# Patient Record
Sex: FEMALE | Race: WHITE
[De-identification: names, ages, dates, MRNs, and addresses within clinical notes are randomized per-mention and may not be internally consistent; named-entity substitution may affect disease eponyms.]

---

## 2020-06-15 ENCOUNTER — HOSPITAL ENCOUNTER (OUTPATIENT)
Dept: HOSPITAL 75 - PREOP | Age: 70
Discharge: HOME | End: 2020-06-15
Attending: SURGERY
Payer: MEDICARE

## 2020-06-15 VITALS — WEIGHT: 196.65 LBS | BODY MASS INDEX: 31.6 KG/M2 | HEIGHT: 65.98 IN

## 2020-06-15 DIAGNOSIS — Z01.818: Primary | ICD-10-CM

## 2020-06-15 DIAGNOSIS — Z11.59: ICD-10-CM

## 2020-06-15 PROCEDURE — 87635 SARS-COV-2 COVID-19 AMP PRB: CPT

## 2020-06-18 ENCOUNTER — HOSPITAL ENCOUNTER (OUTPATIENT)
Dept: HOSPITAL 75 - SDC | Age: 70
Discharge: HOME | End: 2020-06-18
Attending: SURGERY
Payer: MEDICARE

## 2020-06-18 VITALS — DIASTOLIC BLOOD PRESSURE: 70 MMHG | SYSTOLIC BLOOD PRESSURE: 137 MMHG

## 2020-06-18 VITALS — SYSTOLIC BLOOD PRESSURE: 162 MMHG | DIASTOLIC BLOOD PRESSURE: 80 MMHG

## 2020-06-18 VITALS — BODY MASS INDEX: 31.98 KG/M2 | WEIGHT: 196.65 LBS | HEIGHT: 65.75 IN

## 2020-06-18 VITALS — DIASTOLIC BLOOD PRESSURE: 66 MMHG | SYSTOLIC BLOOD PRESSURE: 153 MMHG

## 2020-06-18 VITALS — SYSTOLIC BLOOD PRESSURE: 151 MMHG | DIASTOLIC BLOOD PRESSURE: 69 MMHG

## 2020-06-18 VITALS — DIASTOLIC BLOOD PRESSURE: 76 MMHG | SYSTOLIC BLOOD PRESSURE: 160 MMHG

## 2020-06-18 VITALS — DIASTOLIC BLOOD PRESSURE: 68 MMHG | SYSTOLIC BLOOD PRESSURE: 121 MMHG

## 2020-06-18 VITALS — SYSTOLIC BLOOD PRESSURE: 150 MMHG | DIASTOLIC BLOOD PRESSURE: 65 MMHG

## 2020-06-18 VITALS — SYSTOLIC BLOOD PRESSURE: 153 MMHG | DIASTOLIC BLOOD PRESSURE: 75 MMHG

## 2020-06-18 VITALS — DIASTOLIC BLOOD PRESSURE: 78 MMHG | SYSTOLIC BLOOD PRESSURE: 147 MMHG

## 2020-06-18 VITALS — DIASTOLIC BLOOD PRESSURE: 69 MMHG | SYSTOLIC BLOOD PRESSURE: 161 MMHG

## 2020-06-18 DIAGNOSIS — Z88.2: ICD-10-CM

## 2020-06-18 DIAGNOSIS — Z83.3: ICD-10-CM

## 2020-06-18 DIAGNOSIS — K80.10: Primary | ICD-10-CM

## 2020-06-18 DIAGNOSIS — Z90.710: ICD-10-CM

## 2020-06-18 DIAGNOSIS — K82.8: ICD-10-CM

## 2020-06-18 DIAGNOSIS — I10: ICD-10-CM

## 2020-06-18 DIAGNOSIS — Z11.2: ICD-10-CM

## 2020-06-18 DIAGNOSIS — Z79.899: ICD-10-CM

## 2020-06-18 DIAGNOSIS — Z80.42: ICD-10-CM

## 2020-06-18 PROCEDURE — 88304 TISSUE EXAM BY PATHOLOGIST: CPT

## 2020-06-18 PROCEDURE — 87081 CULTURE SCREEN ONLY: CPT

## 2020-06-18 RX ADMIN — SODIUM CHLORIDE, SODIUM LACTATE, POTASSIUM CHLORIDE, AND CALCIUM CHLORIDE PRN MLS/HR: 600; 310; 30; 20 INJECTION, SOLUTION INTRAVENOUS at 11:13

## 2020-06-18 RX ADMIN — SODIUM CHLORIDE, SODIUM LACTATE, POTASSIUM CHLORIDE, AND CALCIUM CHLORIDE PRN MLS/HR: 600; 310; 30; 20 INJECTION, SOLUTION INTRAVENOUS at 09:08

## 2020-06-18 NOTE — NUR
O2 SAT RUNNING 87% ON ROOM AIR. TURNED O2 ON AT 2 LITERS/MIN PER NASAL CANNULA. PATIENT 
REPORTS PAIN IS "GOING AWAY BUT I'M VERY SLEEPY." S.O. ATTENTIVE AT THE BEDSIDE.

## 2020-06-18 NOTE — XMS REPORT
Continuity of Care Document

                             Created on: 2020



JUSTUS ASH

External Reference #: [id number]

: 1950

Sex: Female



Demographics





                          Address                   [address]

[place]  [address]

 

                          Home Phone                [phone] x

 

                          Preferred Language        Unknown

 

                          Marital Status            Unknown

 

                          Pentecostalism Affiliation     Unknown

 

                          Race                      Unknown

 

                          Ethnic Group              Unknown





Author





                          Organization              Unknown

 

                          Address                   Unknown

 

                          Phone                     Unavailable



              



Allergies

      



             Active           Description           Code           Type         

  Severity   

                Reaction           Onset           Reported/Identified          

 

Relationship to Patient                 Clinical Status        

 

             Yes           SULFA (SULFONAMIDE ANTIBIOTICS)                      

               

UNKNOWN           UNKNOWN                                                

  

      

 

                Yes             Sulfa (Sulfonamide Antibiotics)           [id number]

0491           

Drug Allergy           Unknown           Hives                           

020 

                                                             



                    



Medications

      



There is no data.                  



Problems

      



             Date Dx Coded           Attending           Type           Code    

       

Diagnosis                               Diagnosed By        

 

             1511           DEANA KAY MD           Ot           Z01.81

8           

ENCOUNTER FOR OTHER PREPROCEDURAL EXAMIN                    

 

             1511           DEANA KAY MD           Ot           Z11.59

           

ENCOUNTER FOR SCREENING FOR OTHER VIRAL                     

 

             10/16/2018                        W            272.8           OTHE

R DISORDERS OF 

LIPOID METABOLISM                                

 

             10/16/2018                        W            296.30           CHRISTINE

OR DEPRESSIVE 

DISORDER, RECURRENT EPISODE, UNSPECIFIED DEGREE                    

 

             10/16/2018                        W            530.81           ESO

PHAGEAL REFLUX 

                                                 

 

             10/16/2018                        W            E78.5           HYPE

RLIPIDEMIA, 

UNSPECIFIED                                      

 

             10/16/2018                        W            F33.9           JAQUELIN

R DEPRESSIVE 

DISORDER, RECURRENT, UNSPECIFIED                    

 

             10/16/2018                        W            K21.9           DEVYN

RO-ESOPHAGEAL 

REFLUX DISEASE WITHOUT ESOPHAGITIS                    

 

             10/16/2018                        W            V12.29           PER

GEOFF HISTORY 

OF OTHER ENDOCRINE, METABOLIC, AND IMMUNITY DISORDERS                    

 

             10/16/2018                        W            Z87.39           PER

GEOFF HISTORY 

OF OTHER DISEASES OF THE MUSCULOSKELETAL SYSTEM AND CONNECTIVE TISSUE           

        

 

             2019                        W            466.0           ACUT

E BRONCHITIS   

                                                 

 

             2019                        W            780.79           OTH

ER MALAISE AND 

FATIGUE                                          

 

             2019                        W            786.05           LINDA

RTNESS OF 

BREATH                                           

 

             2019                        W            J20.9           ACUT

E BRONCHITIS, 

UNSPECIFIED                                      

 

             2019                        W            R06.02           LINDA

RTNESS OF 

BREATH                                           

 

           2019                       W           R53.1           WEAKNESS

           

        

 

             2019                        W            401.0           MATTHEW

GNANT ESSENTIAL

HYPERTENSION                                     

 

             2019                        W            530.81           ESO

PHAGEAL REFLUX 

                                                 

 

             2019                        W            599.0           URIN

EDWIN TRACT 

INFECTION, SITE NOT SPECIFIED                     

 

             2019                        W            788.41           URI

NARY FREQUENCY 

                                                 

 

             2019                        W            I10           ESSENT

IAL (PRIMARY) 

HYPERTENSION                                     

 

             2019                        W            K21.0           DEVYN

RO-ESOPHAGEAL 

REFLUX DISEASE WITH ESOPHAGITIS                    

 

             2019                        W            N39.0           URIN

EDWIN TRACT 

INFECTION, SITE NOT SPECIFIED                    

 

             2019                        W            R35.0           FREQ

UENCY OF 

MICTURITION                                      

 

             2019                        W            311           DEPRES

SIVE DISORDER, 

NOT ELSEWHERE CLASSIFIED                         

 

             2019                        W            401.0           MATTEHW

GNANT ESSENTIAL

HYPERTENSION                                     

 

             2019                        W            530.81           ESO

PHAGEAL REFLUX 

                                                 

 

             2019                        W            F32.9           JAQUELIN

R DEPRESSIVE 

DISORDER, SINGLE EPISODE, UNSPECIFIED                    

 

             2019                        W            I10           ESSENT

IAL (PRIMARY) 

HYPERTENSION                                     

 

             2019                        W            K21.0           DEVYN

RO-ESOPHAGEAL 

REFLUX DISEASE WITH ESOPHAGITIS                    

 

             08/15/2019           Katrin, Raquel           W            401.0     

      

MALIGNANT ESSENTIAL HYPERTENSION                    

 

             08/15/2019           Katrin, Raquel           W            I10       

    ESSENTIAL 

(PRIMARY) HYPERTENSION                           

 

             08/15/2019           Katrin, Raqule           W            401.0     

      

MALIGNANT ESSENTIAL HYPERTENSION                    

 

             08/15/2019           Katrin, Raquel           W            I10       

    ESSENTIAL 

(PRIMARY) HYPERTENSION                           

 

             08/15/2019           Katrin, Raquel           W            401.0     

      

MALIGNANT ESSENTIAL HYPERTENSION                    

 

             08/15/2019           Katrin, Raquel           W            530.81    

       

ESOPHAGEAL REFLUX                                

 

             08/15/2019           Katrin, Raquel           W            I10       

    ESSENTIAL 

(PRIMARY) HYPERTENSION                           

 

             08/15/2019           Katrin, Raquel           W            K21.0     

      GASTRO-

ESOPHAGEAL REFLUX DISEASE WITH ESOPHAGITIS                    

 

             08/15/2019           Katrin, Raquel           W            401.0     

      

MALIGNANT ESSENTIAL HYPERTENSION                    

 

             08/15/2019           Katrin, Raquel           W            530.81    

       

ESOPHAGEAL REFLUX                                

 

             08/15/2019           Katrin, Raquel           W            I10       

    ESSENTIAL 

(PRIMARY) HYPERTENSION                           

 

             08/15/2019           Katrin, Raquel           W            K21.0     

      GASTRO-

ESOPHAGEAL REFLUX DISEASE WITH ESOPHAGITIS                    

 

             2019           Katrin, Raquel           W            788.41    

       URINARY

FREQUENCY                                        

 

             2019           Katrin, Raquel           W            R35.0     

      

FREQUENCY OF MICTURITION                         

 

             2019           Katrin, Raquel           W            599.0     

      URINARY 

TRACT INFECTION, SITE NOT SPECIFIED                     

 

             2019           Katrin, Raquel           W            788.41    

       URINARY

FREQUENCY                                        

 

             2019           Katrin, Raquel           W            N39.0     

      URINARY 

TRACT INFECTION, SITE NOT SPECIFIED                    

 

             2019           Katrin, Raquel           W            R35.0     

      

FREQUENCY OF MICTURITION                         

 

             2019           Katrin, Raquel           W            599.0     

      URINARY 

TRACT INFECTION, SITE NOT SPECIFIED                     

 

             2019           Katrin, Raquel           W            788.41    

       URINARY

FREQUENCY                                        

 

             2019           Katrin, Raquel           W            N39.0     

      URINARY 

TRACT INFECTION, SITE NOT SPECIFIED                    

 

             2019           Katrin, Raquel           W            R35.0     

      

FREQUENCY OF MICTURITION                         

 

             2019           Katrin, Raquel           W            599.0     

      URINARY 

TRACT INFECTION, SITE NOT SPECIFIED                     

 

             2019           Katrin, Raquel           W            788.41    

       URINARY

FREQUENCY                                        

 

             2019           Katrin, Raquel           W            N39.0     

      URINARY 

TRACT INFECTION, SITE NOT SPECIFIED                    

 

             2019           Katrin, Raquel           W            R35.0     

      

FREQUENCY OF MICTURITION                         

 

             2019           Katrin, Raquel           W            599.0     

      URINARY 

TRACT INFECTION, SITE NOT SPECIFIED                     

 

             2019           Katrin, Raquel           W            788.41    

       URINARY

FREQUENCY                                        

 

             2019           Katrin, Raquel           W            N39.0     

      URINARY 

TRACT INFECTION, SITE NOT SPECIFIED                    

 

             2019           Katrin, Raquel           W            R35.0     

      

FREQUENCY OF MICTURITION                         



                                                                                
                                                       



Procedures

      



There is no data.                  



Results

      



                    Test                Result              Range        

 

                                        Cardiac Panel - 17 12:31         

 

                    CK                  72 U/L                      

 

                    CK-MB               2.4 ng/ml           0.0-9.2        

 

                    Myoglobin           45.3 ng/ml           1.6-106.0        

 

                    Troponin            <0.020 ng/mL           0.0-0.4        

 

                                        Lipid Panel - 17 07:40         

 

                    C/HDL               6.1                 3.7-6.7        

 

                    Cholesterol           236 mg/dL           100-240        

 

                    HDL                 39 mg/dL            30-85        

 

                    LDL-Calculated           178 mg/dL           0-100        

 

                    Trig                97 mg/dL                    

 

                    VLDL                19 mg/dL            0-42        

 

                                        Urine Culture - 17 15:50         

 

                    PRELIM CULTURE RESULTS           No Growth 24 hours         

            

 

                          FINAL CULTURE RESULTS           10,000-20,000 Gram Pos

itive Mixed Katrin Probable

Skin Contaminant No Further Workup done                     

 

                    MEDIA PLATED           Setup at 15:23 on 2017          

           

 

                    CULTURE SOURCE           Urinalysis                     

 

                                        Free T4 - 10/13/18 10:10         

 

                    Free T4             0.91 ng/dL           0.81-1.61        

 

                                        Thyroid Stimulating Hormone - 10/13/18 1

0:10         

 

                    TSH                 2.73 mIU/mL           0.32-5.00        

 

                                        Drug Screen + ETOH - 19 15:00     

    

 

                               Debbie Jalloh                     

 

                    Donor ID By           Photo ID                     

 

                    Ethanol, Urine           <10.00 mg/dL           20.00-80.00 

       

 

                    Location            [facility]                          

 

                    Reason For Test           Pre-Employment                    

 

 

                    Temperature In Range           YES Deg F           90.

.00        

 

                    Urine Amphetamines           NEGATIVE                     

 

                    Urine Barbiturates           NEGATIVE                     

 

                    Urine Benzodiazepines           NEGATIVE                    

 

 

                    Urine Cocaine           NEGATIVE                     

 

                    Urine MDMA           NEGATIVE                     

 

                    Urine Methadone           NEGATIVE                     

 

                    Urine Methamphetamines           NEGATIVE                   

  

 

                    Urine Opiates           NEGATIVE                     

 

                    Urine Oxycodone           NEGATIVE                     

 

                    Urine PCP           NEGATIVE                     

 

                    Urine THC Metabolite           NEGATIVE                     

 

                                        CBC with Auto Diff - 20 07:50     

    

 

                    Baso%               0.30 %              0.00-2.50        

 

                    Eos                 0.2 K/uL            0.0-0.7        

 

                    Eos%                1.8 %               0.0-7.0        

 

                    Hct                 40.6 %              36.0-46.0        

 

                    Hgb                 13.2 g/dL           13.0-15.0        

 

                    Lym                 2.09 K/uL           0.60-3.40        

 

                    Lym%                23.0 %              10.0-50.0        

 

                    MCH                 30.6 pg             27.0-31.0        

 

                    MCHC                32.5 g/dL           32.0-36.0        

 

                    MCV                 94.2 fL             80.0-97.0        

 

                    Mono%               7.0 %               0.0-12.0        

 

                    MPV                 9.2 fL              7.4-10.0        

 

                    Reuben%                67.9 %              37.0-80.0        

 

                    Plt                 322 K/uL            150-400        

 

                    RBC                 4.31 M/uL           3.60-5.00        

 

                    RDW                 13.1 %              11.6-14.8        

 

                    WBC                 9.08 K/uL           5.00-10.00        

 

                    Reuben                 6.16 K/uL           2.00-6.90        

 

                    Mono                0.6 K/uL            0.0-0.9        

 

                    Baso                0.0 K/uL            0.0-0.2        

 

                                        Coronavirus SARS-CoV-2 SO 2019 - 06/15/2

0 08:00         

 

                    Coronavirus Ab [Units/volume] in Serum           Negative   

         Negative   

     



                              



Encounters

      



                ACCT No.           Visit Date/Time           Discharge          

 Status         

             Pt. Type           Provider           Facility           Loc./Unit 

          

Complaint        

 

                [id number]           2020 08:46:00           2020 23:59

:00           

[facility]             Outpatient           [facility] Wil[facility]                               

      

                                                 

 

                [id number]           2020 08:42:00           2020 23:59

:00           

DIS             Outpatient           [facility] Wilkes                               

      

                                                 

 

                [id number]           2020 08:40:00           2020 23:59

:00           

DIS             Outpatient           Katrin, Raquel                               

      

                                                 

 

                [id number]           2020 07:37:00           2020 23:59

:00           

DIS             Outpatient           Katrin, Raquel                               

      

                                                 

 

                [id number]           2020 10:51:00           2020 23:59

:00           

DIS             Outpatient           Katrin, Raquel                               

      

                                                 

 

                [id number]           2020 14:31:00           2020 23:59

:00           

DIS             Outpatient           Katrin, Raquel                               

      

                                                 

 

                [id number]           2019 14:46:00           2019 23:59:

00           DIS

                Outpatient           UNLISTED, UNLISTED                         

         

                                                 

 

                [id number]           2019 16:00:00           2019 23:59:

00           DIS

             Outpatient           Katrin, Raquel                                  

    

        

 

                [id number]           08/15/2019 15:31:00           08/15/2019 23:59:

00           DIS

             Outpatient           Katrin, Raqule                                  

    

        

 

                [id number]           10/13/2018 10:10:00           10/13/2018 23:59:

00           DIS

             Outpatient           Katrin, Raquel                                  

    

        

 

                [id number]           2017 16:25:00           2017 23:59:

00           DIS

                Outpatient           Doug Galvan                             

         

                                                 

 

                [id number]           2017 07:40:00           2017 23:59:

00           DIS

                Outpatient           Doug Galvan                             

         

                                                 

 

                [id number]           2017 12:02:00           2017 23:59:

00           DIS

                Outpatient           Doug Galvan                             

         

                                                 

 

             [id number]           2019 16:25:50                               

      Document 

Registration                                                                    

 

             [id number]           2019 14:28:00                               

      Document 

Registration                                                                    

 

             [id number]           2019 10:46:00                               

      Document 

Registration                                                                    

 

             [id number]           2019 11:06:00                               

      Document 

Registration                                                                    

 

             [id number]           10/16/2018 16:01:00                               

      Document 

Registration                                                                    

 

                    [id number]           06/15/2020 05:45:00           06/15/2

020 15:12:00        

                DIS             Outpatient           DEANA KAY MD           

[facility]           PREOP                     [place]        

 

             [id number]           2020 09:00:00                        P

EN           

Preadmit                  DEANA KAY MD           [facility]                       [place]

## 2020-06-18 NOTE — NUR
DISCHARGE INSTRUCTIONS WENT OVER WITH THE PATIENT. PATIENT COMPLAINS OF NAUSEA. ORDER 
RECEIVED FOR ZOFRAN 8 MG IV.

PATIENT HAS RECEIVED 1950 CC OF NORMAL SALINE. O2 SAT 94% ON ROOM AIR.

## 2020-06-18 NOTE — PROGRESS NOTE-PRE OPERATIVE
Pre-Operative Progress Note


H&P Reviewed


The H&P was reviewed, patient examined and no changes noted.


Date Seen by Provider:  Jun 18, 2020


Time Seen by Provider:  08:45


Date H&P Reviewed:  Jun 18, 2020


Time H&P Reviewed:  08:40


Pre-Operative Diagnosis:  Chronic calculous cholecystitis











LETTY ORTEGA APRN          Jun 18, 2020 08:50

## 2020-06-18 NOTE — PROGRESS NOTE-POST OPERATIVE
Post-Operative Progess Note


Surgeon (s)/Assistant (s)


Surgeon


DEANA KAY MD


Assistant:  sapphire WHITE





Pre-Operative Diagnosis


Chronic calculous cholecystitis





Post-Operative Diagnosis





same





Procedure & Operative Findings


Date of Procedure


6/18/20


Procedure Performed/Findings


laparoscopic cholecystectomy


Anesthesia Type


get





Estimated Blood Loss


Estimated blood loss (mL):  minimal





Specimens/Packing


Specimens Removed


gallbladder











DEANA KAY MD                Jun 18, 2020 11:46

## 2020-06-18 NOTE — ANESTHESIA-GENERAL POST-OP
General


Patient Condition


Mental Status/LOC:  Same as Preop


Cardiovascular:  Satisfactory


Nausea/Vomiting:  Absent


Respiratory:  Satisfactory


Pain:  Controlled


Complications:  Absent





Post Op Complications


Complications


None





Follow Up Care/Instructions


Patient Instructions


None needed.





Anesthesia/Patient Condition


Patient Condition


Patient is doing well, no complaints, stable vital signs, no apparent adverse 

anesthesia problems.   


No complications reported per nursing.


D/C home per [facility] Criteria:  Yes











NICOL WASHINGTON CRNA           Jun 18, 2020 14:44

## 2020-06-18 NOTE — DISCHARGE INST-SURGICAL
D/C Lap Instructions-KIDO


Reconcile Patient Problems


Problems Reviewed?:  Yes


New, Converted, or Re-Newed RX:  RX on Chart


Follow Up Appt in 2 weeks





Activity as tolerated


No driving for 24 hours


No driving while on pain medications





Incentive Spirometry use every 2 hours while awake





Regular Diet





Symptoms to Report: Fever over 101 degree F, Nausea/Vomiting 


Infection Signs and Symptoms to report:  Increased redness, Foul odor of wound, 

Increased drainage





Bathing instructions: May shower


Operative Area Clean/Dry;  Keep incision clean/dry





If any problems/questions: Contact your physician or go to Emergency Room











LETTY ORTEGA          Jun 18, 2020 08:52

## 2020-06-18 NOTE — OPERATIVE REPORT
DATE OF SERVICE:  06/18/2020



ATTENDING PRIMARY CARE CLINICIAN:

ERICA Womack



PREOPERATIVE DIAGNOSIS:

Symptomatic chronic calculous cholecystitis.



POSTOPERATIVE DIAGNOSES:

Symptomatic chronic calculous cholecystitis.



PROCEDURE:

Laparoscopic cholecystectomy.



SURGEON:

Deana Kay MD



ASSISTANT:

ANNE King.



ANESTHESIA:

General endotracheal.



ESTIMATED BLOOD LOSS:

Minimal.



FINDINGS:

Multiple small gallstones, dilated gallbladder.



DISPOSITION:

The patient tolerated the procedure well.



INDICATIONS:

The patient is a 70-year-old female who has had intermittent episodes of pain in

the right upper abdominal quadrant as well as associated bloating and mild

nausea after eating meals.  She states that this has been going on for the past

3 years; however, the past few months, this has become much more significant. 

An ultrasound was performed, which did show gallstones.  She does not report any

major issues with peptic ulcer disease nor gastroesophageal reflux disease.



DESCRIPTION OF PROCEDURE:

The patient was brought to the operating room, laid supine on the table.  After

adequate IV pain and sedative medications and general endotracheal intubation,

the abdomen was prepped and draped in standard surgical fashion.



A 0.5% Marcaine with epinephrine was then used to anesthetize overlying skin in

the left upper abdominal quadrant and a transverse skin incision made using 15

blade.  An 0 silk suture was applied to the medial aspect incision for

retraction and a Veress needle inserted with low opening pressure of 0 mmHg and

the abdomen was then insufflated to 15 mmHg pressure.  The Veress needle removed

and a 5 mm XL trocar placed followed by a 5 mm 45-degree angle laparoscope

visualizing the peritoneal cavity.



A 4-quadrant abdominal exploration was performed.  There were omental adhesions

to the anterior abdominal wall from a previous open hysterectomy.  Under direct

visualization, we then proceeded to place a supraumbilical 10 mm port after the

skin and peritoneal lining were anesthetized using 0.5% Marcaine with

epinephrine.  In a similar fashion, right upper abdominal quadrant 5 mm port was

placed.



The gallbladder was distended.  There was mild gallbladder wall thickening as

well.  The fundus of the gallbladder was then retracted anteriorly and

superiorly, and the patient was then placed in reverse Trendelenburg position as

well as plane right side up and left side down.  The hepatoduodenal ligament was

then opened using blunt dissection as well as electrocautery on the hook

instrument.  The entire critical view of safety was identified including the

triangle of Calot as well as the cystic duct and artery as the only two

structures going into the gallbladder as well as the cystic plate behind the

proximal gallbladder.  A timeout was then taken, and the cystic duct and artery

were then clipped proximally, distally and cut with EndoShears.  The gallbladder

was then dissected off the liver bed using electrocautery and hook instrument

with visualization of good hemostasis as well as no leaking ducts of Luschka. 

The gallbladder was removed through the 10 mm port site using an EndoCatch bag.



The 10 mm port site fascia and peritoneum were then closed under direct

visualization using a Kp-Kerri device and 0 Vicryl suture.  The abdomen

was desufflated and the remaining ports removed.  All skin incisions were closed

using 4-0 Monocryl running subcuticular sutures.  Wounds were then cleaned and

covered with Dermabond.



The patient tolerated the procedure well.  We will start IV normal pain

medication as well as a clear liquid diet.  When she is tolerating clears, has

good pain control with oral pain medications, ambulating well, we will discharge

her home.





Job ID: [id number]

DocumentID: [id number]

Dictated Date:  06/18/2020 11:53:15

Transcription Date: 06/18/2020 14:09:36

Dictated By: DEANA KAY MD

## 2020-12-28 ENCOUNTER — HOSPITAL ENCOUNTER (OUTPATIENT)
Dept: HOSPITAL 75 - PREOP | Age: 70
Discharge: HOME | End: 2020-12-28
Attending: SURGERY
Payer: MEDICARE

## 2020-12-28 VITALS — BODY MASS INDEX: 33.24 KG/M2 | HEIGHT: 65.75 IN | WEIGHT: 204.37 LBS

## 2020-12-28 DIAGNOSIS — Z01.818: Primary | ICD-10-CM

## 2020-12-28 DIAGNOSIS — Z20.828: ICD-10-CM

## 2020-12-28 DIAGNOSIS — Z12.11: ICD-10-CM

## 2020-12-28 DIAGNOSIS — K21.9: ICD-10-CM

## 2020-12-28 PROCEDURE — 87635 SARS-COV-2 COVID-19 AMP PRB: CPT

## 2020-12-30 ENCOUNTER — HOSPITAL ENCOUNTER (OUTPATIENT)
Dept: HOSPITAL 75 - ENDO | Age: 70
Discharge: HOME | End: 2020-12-30
Attending: SURGERY
Payer: MEDICARE

## 2020-12-30 VITALS — DIASTOLIC BLOOD PRESSURE: 79 MMHG | SYSTOLIC BLOOD PRESSURE: 130 MMHG

## 2020-12-30 VITALS — DIASTOLIC BLOOD PRESSURE: 54 MMHG | SYSTOLIC BLOOD PRESSURE: 110 MMHG

## 2020-12-30 VITALS — SYSTOLIC BLOOD PRESSURE: 140 MMHG | DIASTOLIC BLOOD PRESSURE: 78 MMHG

## 2020-12-30 VITALS — DIASTOLIC BLOOD PRESSURE: 66 MMHG | SYSTOLIC BLOOD PRESSURE: 115 MMHG

## 2020-12-30 VITALS — HEIGHT: 65.75 IN | BODY MASS INDEX: 33.24 KG/M2 | WEIGHT: 204.37 LBS

## 2020-12-30 VITALS — DIASTOLIC BLOOD PRESSURE: 66 MMHG | SYSTOLIC BLOOD PRESSURE: 120 MMHG

## 2020-12-30 VITALS — SYSTOLIC BLOOD PRESSURE: 115 MMHG | DIASTOLIC BLOOD PRESSURE: 66 MMHG

## 2020-12-30 DIAGNOSIS — K21.00: ICD-10-CM

## 2020-12-30 DIAGNOSIS — K29.50: ICD-10-CM

## 2020-12-30 DIAGNOSIS — Z80.42: ICD-10-CM

## 2020-12-30 DIAGNOSIS — K22.2: ICD-10-CM

## 2020-12-30 DIAGNOSIS — Z79.899: ICD-10-CM

## 2020-12-30 DIAGNOSIS — K64.1: ICD-10-CM

## 2020-12-30 DIAGNOSIS — Z88.2: ICD-10-CM

## 2020-12-30 DIAGNOSIS — Z83.3: ICD-10-CM

## 2020-12-30 DIAGNOSIS — Z12.11: Primary | ICD-10-CM

## 2020-12-30 DIAGNOSIS — I10: ICD-10-CM

## 2020-12-30 DIAGNOSIS — K21.9: ICD-10-CM

## 2020-12-30 DIAGNOSIS — Z90.711: ICD-10-CM

## 2020-12-30 PROCEDURE — 43249 ESOPH EGD DILATION <30 MM: CPT

## 2020-12-30 PROCEDURE — 88305 TISSUE EXAM BY PATHOLOGIST: CPT

## 2020-12-30 PROCEDURE — 43239 EGD BIOPSY SINGLE/MULTIPLE: CPT

## 2020-12-30 RX ADMIN — LIDOCAINE HYDROCHLORIDE PRN ML: 20 JELLY TOPICAL at 12:28

## 2020-12-30 RX ADMIN — LIDOCAINE HYDROCHLORIDE PRN ML: 20 JELLY TOPICAL at 12:13

## 2020-12-30 NOTE — OPERATIVE REPORT
DATE OF SERVICE:  12/30/2020



ATTENDING PRIMARY CLINICIAN:

ERICA Womack.



PREOPERATIVE DIAGNOSES:

Gastroesophageal reflux disease, screening colonoscopy.



POSTOPERATIVE DIAGNOSES:

Reflux esophagitis stage II with a mild distal esophageal stricture and

Schatzki's ring.  Mild gastritis.  No distal obstructions.  Chronic stage II

external and internal hemorrhoids.



PROCEDURE:

EGD with biopsy and balloon dilatation, colonoscopy.



SURGEON:

Deana Kay MD



ANESTHESIA:

Monitored anesthesia care.



ESTIMATED BLOOD LOSS:

Minimal.



FINDINGS:

Reflux esophagitis stage II with a mild distal esophageal stricture and

Schatzki's ring.  Mild gastritis.  No distal obstructions.  Chronic stage II

external and internal hemorrhoids.



DISPOSITION:

The patient tolerated the procedure well.



INDICATIONS:

The patient is a 70-year-old female known to us.  We had initially seen her

06/2020 for intermittent right upper abdominal quadrant pain, was found to have

gallstones and underwent a laparoscopic cholecystectomy on 06/28/2020.  She

reports she has had upper abdominal pain and bloating usually after eating and

also does have epigastric burning sensation.  She also does report some

substernal pressure sensation after food bolus.  She is also in need of a

screening colonoscopy.  She has not had a colonoscopy up to this point in her

life.  She does not report any family history of colon cancer.



DESCRIPTION OF PROCEDURE:

The patient was brought to the endoscopy suite, laid in the left lateral

decubitus position.  After adequate IV pain and sedative medications and

monitored anesthesia care, the mouthpiece was applied.



The endoscope was placed in the mouth, visualizing the pharynx and

hypopharyngeal region.  Vocal cords, epiglottis and vallecula identified and

appeared to be normal.  The endoscope was gently intubated into the esophageal

opening and esophagus insufflated.  The endoscope was then advanced to the

first, second and third portion of the esophagus at the level of the GE

junction, a mild distal esophageal stricture and Schatzki's ring identified. 

There was also reflux esophagitis stage II.  A biopsy was taken of the GE

junction with forceps with visualization of good hemostasis.



The endoscope was then advanced in the stomach and endoscope retroflexed,

visualizing a small hiatal hernia approximately 1.5 cm in size.  There was a

mild gastritis.  No formal ulcerations, polyps, or any neoplasms.  A biopsy was

taken of the antrum to rule out H. pylori with visualization of good hemostasis.

 The endoscope was then advanced to the pylorus and first and second portion of

the duodenum, which appeared normal with no distal obstructions.



We then proceeded with balloon dilatation of distal esophageal stricture.  The

balloon was placed in the stomach and pulled back to the area of the stricture. 

We first proceeded to 2, 4 and then 6 atmospheres of pressure or 20 mm in

luminal diameter in a stepwise fashion with moderate resistance.  This was left

in place for 60 seconds and the balloon was desufflated and removed with

visualization of good hemostasis as well as no mucosal tears.  Endoscope was

then slowly withdrawn while taking a second look and suctioning of residual air

with no additional findings.



We then proceeded with colonoscopy portion of the procedure.  Digital rectal

examination was performed, which revealed mild chronic stage II external and

internal hemorrhoids, not actively edematous nor inflamed and no bleeding. 

Normal sphincter tone was felt and there were no palpable masses.



The endoscope was then intubated into anus and rectum gently insufflated.  The

endoscope was then advanced to the valves of Martines of the rectum with no

polyps or any neoplasms identified.  We then proceeded through the sigmoid colon

where no diverticulosis identified.  The endoscope was then advanced and

remainder of the descending, transverse and ascending colon to the cecum.  These

segments were normal.  There were no polyps or any neoplasms identified

throughout the colon or rectum.  The endoscope was then slowly withdrawn while

taking a second look and suctioning of residual air with no additional findings.



The patient tolerated the procedure well.  We will recommend the necessary

lifestyle and diet accommodation including small and more frequent meals,

avoidance of eating at night as well as head elevation while lying supine.  She

also needs to avoid caffeinated beverages, spicy, greasy and acidic foods and

continue to take her omeprazole 40 mg daily.  We will also recommend a high

fiber diet with at least 25 grams of fiber daily to promote soft stools on a

daily basis.  If she is asymptomatic, she does not need another colonoscopy for

another 10 years.





Job ID: [id number]

DocumentID: [id number]

Dictated Date:  12/30/2020 12:58:43

Transcription Date: 12/30/2020 20:12:30

Dictated By: DEANA KAY MD

## 2020-12-30 NOTE — ANESTHESIA-GENERAL POST-OP
MAC


Patient Condition


Mental Status/LOC:  Same as Preop


Cardiovascular:  Satisfactory


Nausea/Vomiting:  Absent


Respiratory:  Satisfactory


Pain:  Controlled


Complications:  Absent





Post Op Complications


Complications


None





Follow Up Care/Instructions


Patient Instructions


None needed.





Anesthesiology Discharge Order


Discharge Order


Patient is doing well, no complaints, stable vital signs, no apparent adverse 

anesthesia problems.   


No complications reported per nursing.











JEAN-CLAUDE MCDONALD CRNA          Dec 30, 2020 14:53

## 2020-12-30 NOTE — DISCHARGE INST-SURGICAL
D/C Lap Instructions-ELIZA


Follow Up 





Activity as tolerated








High Fiber Diet 25g or more per day





Avoid Alcohol, Caffeine, Spicy Greasy and Acid foods.





Drink 64 fluid oz or more of fluids per day.





Symptoms to Report: Fever over 101 degree F, Nausea/Vomiting 


If any problems/questions: Contact your physician or go to Emergency Room











DEANA KAY MD                Dec 30, 2020 10:47

## 2020-12-30 NOTE — PROGRESS NOTE-POST OPERATIVE
Post-Operative Progess Note


Surgeon (s)/Assistant (s)


Surgeon


DEANA KAY MD


Assistant:  none





Pre-Operative Diagnosis


GERD, screening colo





Post-Operative Diagnosis





reflux esophagitis(stage 2), mild distal esophageal stricture, small


HH(1.5cm), mild gastritis.


mild chronic stage 2 ext and int hemorrhoids.





Procedure & Operative Findings


Date of Procedure


12/30/20


Procedure Performed/Findings


EGD with bx and balloon dilatation.


Colonoscopy.


Anesthesia Type


mac





Estimated Blood Loss


Estimated blood loss (mL):  minimal





Specimens/Packing


Specimens Removed


ge jxn, antrum











DEANA KAY MD                Dec 30, 2020 13:12

## 2020-12-30 NOTE — CONSCIOUS SEDATION/ASA
Conscious Sedation Pre-Proced


Time


10:30





ASA Score


2


For ASA 3 and 4: Consider anesthesia and medical clearance. Also, for patients

with a history of failed moderate sedation consider anesthesia.

















Airway 


 


Lungs 


 


Heart 


 


 ASA score


 


 ASA 1: a normal healthy patient


 


 ASA 2:  a patient with a mild systemic disease (mid diabetes, controlled 

hypertension, obesity 


 


 ASA 3:  a patient with a severe systemic disease that limits activity  (angina,

COPD, prior Myocardial infarction)


 


 ASA 4:  a patient with an incapacitating disease that is a constant threat to 

life (CHF, renal failure)


 


 ASA 5:  a moribund patient not expected to survive 24 hrs.  (ruptured aneurysm)


 


 ASA 6:  a declared brain-dead patient whose organs are being harvested.


 


 For emergent operations, add the letter E after the classification











Mallampati Classification


Grade 2





Sedation Plan


Analgesia, Amnesia, Plan communicated to team members, Discussed options with 

patient/fam, Discussed risks with patient/fam


The patient is an appropriate candidate to undergo the planned procedure, 

sedation, and anesthesia.





The patient immediately re-assessed prior to indication.











DEANA KAY MD                Dec 30, 2020 10:45

## 2022-09-14 ENCOUNTER — HOSPITAL ENCOUNTER (EMERGENCY)
Dept: HOSPITAL 75 - ER | Age: 72
Discharge: HOME | End: 2022-09-14
Payer: COMMERCIAL

## 2022-09-14 VITALS — DIASTOLIC BLOOD PRESSURE: 79 MMHG | SYSTOLIC BLOOD PRESSURE: 150 MMHG

## 2022-09-14 VITALS — HEIGHT: 65.75 IN | WEIGHT: 145.95 LBS | BODY MASS INDEX: 23.74 KG/M2

## 2022-09-14 DIAGNOSIS — Z28.311: ICD-10-CM

## 2022-09-14 DIAGNOSIS — R55: Primary | ICD-10-CM

## 2022-09-14 LAB
ALBUMIN SERPL-MCNC: 4.2 GM/DL (ref 3.2–4.5)
ALP SERPL-CCNC: 39 U/L (ref 40–136)
ALT SERPL-CCNC: 21 U/L (ref 0–55)
APTT BLD: 26 SEC (ref 24–35)
APTT PPP: YELLOW S
BACTERIA #/AREA URNS HPF: NEGATIVE /HPF
BASOPHILS # BLD AUTO: 0.1 10^3/UL (ref 0–0.1)
BASOPHILS NFR BLD AUTO: 1 % (ref 0–10)
BILIRUB SERPL-MCNC: 0.3 MG/DL (ref 0.1–1)
BILIRUB UR QL STRIP: NEGATIVE
BUN/CREAT SERPL: 20
CALCIUM SERPL-MCNC: 8.9 MG/DL (ref 8.5–10.1)
CHLORIDE SERPL-SCNC: 107 MMOL/L (ref 98–107)
CO2 SERPL-SCNC: 23 MMOL/L (ref 21–32)
CREAT SERPL-MCNC: 0.8 MG/DL (ref 0.6–1.3)
D DIMER PPP FEU-MCNC: 0.38 UG/ML (ref 0–0.49)
EOSINOPHIL # BLD AUTO: 0.1 10^3/UL (ref 0–0.3)
EOSINOPHIL NFR BLD AUTO: 1 % (ref 0–10)
FIBRINOGEN PPP-MCNC: CLEAR MG/DL
GFR SERPLBLD BASED ON 1.73 SQ M-ARVRAT: 79 ML/MIN
GLUCOSE SERPL-MCNC: 114 MG/DL (ref 70–105)
GLUCOSE UR STRIP-MCNC: NEGATIVE MG/DL
HCT VFR BLD CALC: 37 % (ref 35–52)
HGB BLD-MCNC: 12.2 G/DL (ref 11.5–16)
HYALINE CASTS #/AREA URNS LPF: (no result) /LPF
INR PPP: 1.1 (ref 0.8–1.4)
KETONES UR QL STRIP: NEGATIVE
LEUKOCYTE ESTERASE UR QL STRIP: (no result)
LYMPHOCYTES # BLD AUTO: 1.7 10^3/UL (ref 1–4)
LYMPHOCYTES NFR BLD AUTO: 22 % (ref 12–44)
MAGNESIUM SERPL-MCNC: 1.9 MG/DL (ref 1.6–2.4)
MANUAL DIFFERENTIAL PERFORMED BLD QL: NO
MCH RBC QN AUTO: 31 PG (ref 25–34)
MCHC RBC AUTO-ENTMCNC: 33 G/DL (ref 32–36)
MCV RBC AUTO: 95 FL (ref 80–99)
MONOCYTES # BLD AUTO: 0.5 10^3/UL (ref 0–1)
MONOCYTES NFR BLD AUTO: 6 % (ref 0–12)
NEUTROPHILS # BLD AUTO: 5.4 10^3/UL (ref 1.8–7.8)
NEUTROPHILS NFR BLD AUTO: 70 % (ref 42–75)
NITRITE UR QL STRIP: NEGATIVE
PH UR STRIP: 7 [PH] (ref 5–9)
PLATELET # BLD: 208 10^3/UL (ref 130–400)
PMV BLD AUTO: 9.2 FL (ref 9–12.2)
POTASSIUM SERPL-SCNC: 4.5 MMOL/L (ref 3.6–5)
PROT SERPL-MCNC: 6.5 GM/DL (ref 6.4–8.2)
PROT UR QL STRIP: NEGATIVE
PROTHROMBIN TIME: 14.1 SEC (ref 12.2–14.7)
RBC #/AREA URNS HPF: (no result) /HPF
RENAL EPI CELLS #/AREA URNS HPF: (no result) /HPF
SODIUM SERPL-SCNC: 143 MMOL/L (ref 135–145)
SP GR UR STRIP: 1.01 (ref 1.02–1.02)
SQUAMOUS #/AREA URNS HPF: (no result) /HPF
WBC # BLD AUTO: 7.8 10^3/UL (ref 4.3–11)
WBC #/AREA URNS HPF: (no result) /HPF

## 2022-09-14 PROCEDURE — 80053 COMPREHEN METABOLIC PANEL: CPT

## 2022-09-14 PROCEDURE — 93041 RHYTHM ECG TRACING: CPT

## 2022-09-14 PROCEDURE — 72125 CT NECK SPINE W/O DYE: CPT

## 2022-09-14 PROCEDURE — 81000 URINALYSIS NONAUTO W/SCOPE: CPT

## 2022-09-14 PROCEDURE — 85730 THROMBOPLASTIN TIME PARTIAL: CPT

## 2022-09-14 PROCEDURE — 85379 FIBRIN DEGRADATION QUANT: CPT

## 2022-09-14 PROCEDURE — 71045 X-RAY EXAM CHEST 1 VIEW: CPT

## 2022-09-14 PROCEDURE — 85025 COMPLETE CBC W/AUTO DIFF WBC: CPT

## 2022-09-14 PROCEDURE — 83735 ASSAY OF MAGNESIUM: CPT

## 2022-09-14 PROCEDURE — 85610 PROTHROMBIN TIME: CPT

## 2022-09-14 PROCEDURE — 93005 ELECTROCARDIOGRAM TRACING: CPT

## 2022-09-14 PROCEDURE — 36415 COLL VENOUS BLD VENIPUNCTURE: CPT

## 2022-09-14 PROCEDURE — 70450 CT HEAD/BRAIN W/O DYE: CPT

## 2022-09-14 PROCEDURE — 84484 ASSAY OF TROPONIN QUANT: CPT

## 2022-09-14 NOTE — DIAGNOSTIC IMAGING REPORT
PROCEDURE: CT head and CT cervical spine without contrast.



TECHNIQUE: Multiple contiguous axial images were obtained through

the brain and cervical spine without the use of intravenous

contrast. Sagittal and coronal reformations through the cervical

spine were then performed. Auto Exposure Controls were utilized

during the CT exam to meet ALARA standards for radiation dose

reduction. 



INDICATION:  Dizziness and syncope.



COMPARISON: None



FINDINGS: CT head: Ventricles and cortical sulci are

age-appropriate. There is no midline shift or mass-effect. No

acute intra-axial hemorrhage is seen. There are no abnormal areas

of increased or decreased density to suggest acute hemorrhage or

edema. No extra-axial masses or collections are present. The bony

calvarium is intact. The visualized paranasal sinuses are

unremarkable. The mastoid air cells are clear.



CT cervical spine: Evaluation static alignment shows

straightening of normal lordotic curvature. There is also slight

grade 1 anterolisthesis at C3-C4 through C5-C6. There is however

no evidence of jumped facets.



Vertebral body heights are maintained. There is no acute

fracture. No bony fragments are seen within the spinal canal.

Mild multilevel degenerative changes are noted.



Pre and paravertebral soft tissue structures are unremarkable.

Included portions lung apices show no additional acute

abnormalities.



IMPRESSION:  

1. No acute intracranial abnormality. No CT evidence of mass,

acute infarct or intracranial hemorrhage.

2. No acute fracture or dislocation the cervical spine.



Dictated by: 



  Dictated on workstation # [id number]

## 2022-09-14 NOTE — DIAGNOSTIC IMAGING REPORT
INDICATION: Syncope.



COMPARISON: None.



FINDINGS: Single frontal view of the chest demonstrates normal

heart size and pulmonary vascularity. The lungs are well aerated

and clear. No large pleural effusion or pneumothorax is seen. The

visualized osseous structures show no acute abnormalities.



IMPRESSION: 

1. No acute cardiopulmonary process.  



Dictated by: 



  Dictated on workstation # [id number]

## 2022-09-14 NOTE — ED SYNCOPE
General


Chief Complaint:  Dizziness/Syncope


Stated Complaint:  SYNCOPE


Source of Information:  Patient, EMS


Exam Limitations:  No Limitations





History of Present Illness


Date Seen by Provider:  Sep 14, 2022


Time Seen by Provider:  12:08


Initial Comments





Well-appearing 72-year-old female with history of DM, HTN and HLD who presented 

to the ER via [facility] after a syncopal episode at the chiropractor 

office today.  Patient states that she was having her mid thoracic region 

adjusted this is the first time in several months but she has been adjusted in 

this same area approximate 4-5 other times. Denies adjustment of her neck. After

she was adjusted she was sitting in a chair with a TENS unit on, states  she had

about 5 minutes left of the TENS unit when she felt dizzy and had passed out.  

There was no one in room with her, she was found slumped over in chair.  

Bystanders states that it took several minutes to bring her to consciousness.  

Upon arrival she feels nauseated but has no pain. She denies headache, 

dizziness, neck pain, chest pain, shortness of breath, abdominal pain at this ti

me.  States that her nausea is improving.





Allergies and Home Medications


Allergies


Coded Allergies:  


     Sulfa (Sulfonamide Antibiotics) (Verified  Allergy, Mild, Hives, 12/23/20)





Patient Home Medication List


Home Medication List Reviewed:  Yes


Lisinopril (Lisinopril) 5 Mg Tablet, 5 MG PO DAILY, (Reported)


   Entered as Reported by: HOLLY CHAVEZ on 6/11/20 1438


Omeprazole (Omeprazole) 40 Mg Capsule.dr, 40 MG PO DAILY, (Reported)


   Entered as Reported by: ORION MCCONNELL on 12/23/20 1104





Past Medical-Social-Family Hx


Seasonal Allergies


Seasonal Allergies:  Yes (MILD)





Past Medical History


Surgeries:  Yes


Appendectomy, Gallbladder, Hysterectomy, Oophorectomy, Tonsillectomy


Respiratory:  No


Currently Using CPAP:  No


Currently Using BIPAP:  No


Cardiac:  Yes


Hypertension


Neurological:  No


Sexually Transmitted Disease:  No


HIV/AIDS:  No


Genitourinary:  No


Gastrointestinal:  Yes


Gastroesophageal Reflux


Musculoskeletal:  No


Endocrine:  No


HEENT:  No (DENTURES)


Loss of Vision:  Denies


Hearing Impairment:  Denies


Cancer:  No


Psychosocial:  No


Integumentary:  No


Blood Disorders:  No


Adverse Reaction/Blood Tranf:  No (N/A)





Physical Exam


Vital Signs





Vital Signs - First Documented








 9/14/22





 12:19


 


Temp 35.7


 


Pulse 57


 


Resp 22


 


B/P (MAP) 130/66 (87)





Capillary Refill :


Height, Weight, BMI


Height: '"


Weight: lbs. oz. kg; 33.23 BMI


Method:





Progress/Results/Core Measures


Results/Orders


Lab Results





Laboratory Tests








Test


 9/14/22


12:37 9/14/22


12:59 Range/Units


 


 


Urine Color YELLOW    


 


Urine Clarity CLEAR    


 


Urine pH 7.0   5-9  


 


Urine Specific Gravity 1.010 L  1.016-1.022  


 


Urine Protein NEGATIVE   NEGATIVE  


 


Urine Glucose (UA) NEGATIVE   NEGATIVE  


 


Urine Ketones NEGATIVE   NEGATIVE  


 


Urine Nitrite NEGATIVE   NEGATIVE  


 


Urine Bilirubin NEGATIVE   NEGATIVE  


 


Urine Urobilinogen 0.2   < = 1.0  MG/DL


 


Urine Leukocyte Esterase 2+ H  NEGATIVE  


 


Urine RBC (Auto) TRACE-I H  NEGATIVE  


 


Urine RBC NONE    /HPF


 


Urine WBC 2-5    /HPF


 


Urine Squamous Epithelial


Cells 2-5 


 


  /HPF





 


Urine Renal Epithelial Cells RARE    /HPF


 


Urine Crystals NONE    /LPF


 


Urine Bacteria NEGATIVE    /HPF


 


Urine Casts PRESENT    /LPF


 


Urine Hyaline Casts RARE    /LPF


 


Urine Mucus NEGATIVE    /LPF


 


Urine Culture Indicated NO    


 


White Blood Count


 


 7.8 


 4.3-11.0


10^3/uL


 


Red Blood Count


 


 3.92 


 3.80-5.11


10^6/uL


 


Hemoglobin  12.2  11.5-16.0  g/dL


 


Hematocrit  37  35-52  %


 


Mean Corpuscular Volume  95  80-99  fL


 


Mean Corpuscular Hemoglobin  31  25-34  pg


 


Mean Corpuscular Hemoglobin


Concent 


 33 


 32-36  g/dL





 


Red Cell Distribution Width  12.4  10.0-14.5  %


 


Platelet Count


 


 208 


 130-400


10^3/uL


 


Mean Platelet Volume  9.2  9.0-12.2  fL


 


Immature Granulocyte % (Auto)  1   %


 


Neutrophils (%) (Auto)  70  42-75  %


 


Lymphocytes (%) (Auto)  22  12-44  %


 


Monocytes (%) (Auto)  6  0-12  %


 


Eosinophils (%) (Auto)  1  0-10  %


 


Basophils (%) (Auto)  1  0-10  %


 


Neutrophils # (Auto)


 


 5.4 


 1.8-7.8


10^3/uL


 


Lymphocytes # (Auto)


 


 1.7 


 1.0-4.0


10^3/uL


 


Monocytes # (Auto)


 


 0.5 


 0.0-1.0


10^3/uL


 


Eosinophils # (Auto)


 


 0.1 


 0.0-0.3


10^3/uL


 


Basophils # (Auto)


 


 0.1 


 0.0-0.1


10^3/uL


 


Immature Granulocyte # (Auto)


 


 0.0 


 0.0-0.1


10^3/uL


 


Prothrombin Time  14.1  12.2-14.7  SEC


 


INR Comment  1.1  0.8-1.4  


 


Activated Partial


Thromboplast Time 


 26 


 24-35  SEC





 


D-Dimer


 


 0.38 


 0.00-0.49


UG/ML


 


Sodium Level  143  135-145  MMOL/L


 


Potassium Level  4.5  3.6-5.0  MMOL/L


 


Chloride Level  107    MMOL/L


 


Carbon Dioxide Level  23  21-32  MMOL/L


 


Anion Gap  13  5-14  MMOL/L


 


Blood Urea Nitrogen  16  7-18  MG/DL


 


Creatinine


 


 0.80 


 0.60-1.30


MG/DL


 


Estimat Glomerular Filtration


Rate 


 79 


  





 


BUN/Creatinine Ratio  20   


 


Glucose Level  114 H   MG/DL


 


Calcium Level  8.9  8.5-10.1  MG/DL


 


Corrected Calcium  8.7  8.5-10.1  MG/DL


 


Magnesium Level  1.9  1.6-2.4  MG/DL


 


Total Bilirubin  0.3  0.1-1.0  MG/DL


 


Aspartate Amino Transf


(AST/SGOT) 


 19 


 5-34  U/L





 


Alanine Aminotransferase


(ALT/SGPT) 


 21 


 0-55  U/L





 


Alkaline Phosphatase  39 L   U/L


 


Troponin I  < 0.028  <0.028  NG/ML


 


Total Protein  6.5  6.4-8.2  GM/DL


 


Albumin  4.2  3.2-4.5  GM/DL








My Orders





Orders - KELLI BRADLEY APRN


Cbc With Automated Diff (9/14/22 12:19)


Protime With Inr (9/14/22 12:19)


Partial Thromboplastin Time (9/14/22 12:19)


Comprehensive Metabolic Panel (9/14/22 12:19)


Fibrin Degradation Products (9/14/22 12:19)


Troponin I Ralls (9/14/22 12:19)


Ua Culture If Indicated (9/14/22 12:19)


Chest 1 View, Ap/Pa Only (9/14/22 12:19)


Ekg Tracing (9/14/22 12:19)


Accucheck Stat ONCE (9/14/22 12:19)


Ed Iv/Invasive Line Start (9/14/22 12:19)


Vital Signs Stroke Patient Q15M (9/14/22 12:19)


O2 (9/14/22 12:19)


Monitor-Rhythm Ecg Trace Only (9/14/22 12:19)


Dysphagia Screening Tool Q10MX1 (9/14/22 12:19)


Magnesium (9/14/22 12:19)


Ct Head/Cervical Spine Wo (9/14/22 12:19)





Vital Signs/I&O











 9/14/22





 12:19


 


Temp 35.7


 


Pulse 57


 


Resp 22


 


B/P (MAP) 130/66 (87)











Progress


Progress Note :  


Progress Note


Patient examined in no acute distress.  GCS 15.  She is slightly sleepy but is 

alert and answering questions appropriately.  No focal or gross neurological 

deficits.  she does have significant history and risk factors for CVA which 

include hypertension, diabetes, hyperlipidemia.  This was unwitnessed and so she

could potentially have had a TIA given her history.  Injury to vertebral or 

carotid artery unlikely as she had no adjustment of her neck.  She has no chest 

pain at this time however we will go ahead and evaluate for any cardiac origin 

due to syncope.  Orders placed for CT head and neck, stroke and cardiac work-up 

initiated.





Departure


Impression





   Primary Impression:  


   Syncope


Disposition:  01 HOME, SELF-CARE


Condition:  Improved





Departure-Patient Inst.


Decision time for Depature:  14:13


Referrals:  


NO,LOCAL PHYSICIAN (PCP)


Primary Care Physician








JC MELENDEZ (Family)


Primary Care Physician


Patient Instructions:  Syncope (Fainting) (DC)





Add. Discharge Instructions:  


Plan: 


1. Follow up with your primary care provider please call to schedule follow up. 


2. Avoid adjustment's until we have completed your workup. 


3. Reviewed imaging and labs with your primary care provider, will plan to have 

close follow-up, potential cardiac Holter monitor and evaluation of carotid 

arteries.


4. Return to ER if you have any new, concerning, or worsening symptoms. 





Signs of stroke outlined below: 


If you think you may be having a stroke, act F.A.S.T. and do the following test:





FFace: Ask the person to smile. Does one side of the face droop?





AArms: Ask the person to raise both arms. Does one arm drift downward?





SSpeech: Ask the person to repeat a simple phrase. Is the speech slurred or 

strange?





TTime: If you see any of these signs, call 9-1-1 right away.





Note the time when any symptoms first appear. This information helps health care

providers determine the best treatment for each person.





Do not drive to the hospital or let someone else drive you. Call 9-1-1 for an 

ambulance so that medical personnel can begin life-saving treatment on the way 

to the emergency room.





All discharge instructions reviewed with patient and/or family. Voiced 

understanding.











KELLI BRADLEY APRN           Sep 14, 2022 12:26